# Patient Record
Sex: MALE | Employment: FULL TIME | URBAN - METROPOLITAN AREA
[De-identification: names, ages, dates, MRNs, and addresses within clinical notes are randomized per-mention and may not be internally consistent; named-entity substitution may affect disease eponyms.]

---

## 2020-09-01 LAB — MICROALBUMIN UR TEST STR-MCNC: 15 MG/DL

## 2021-02-02 LAB
CREATININE, EXTERNAL: 1.1
HBA1C MFR BLD HPLC: 14.9 %
LDL-C, EXTERNAL: 184

## 2021-02-04 RX ORDER — GLIPIZIDE 10 MG/1
TABLET ORAL
Qty: 60 TAB | Refills: 6 | Status: CANCELLED | OUTPATIENT
Start: 2021-02-04

## 2021-02-04 RX ORDER — GLIPIZIDE 10 MG/1
10 TABLET, FILM COATED, EXTENDED RELEASE ORAL 2 TIMES DAILY
Qty: 60 TAB | Refills: 2 | Status: SHIPPED | OUTPATIENT
Start: 2021-02-04

## 2021-02-04 RX ORDER — DAPAGLIFLOZIN AND METFORMIN HYDROCHLORIDE 5; 1000 MG/1; MG/1
TABLET, FILM COATED, EXTENDED RELEASE ORAL
Qty: 60 TAB | Refills: 6 | Status: SHIPPED | OUTPATIENT
Start: 2021-02-04 | End: 2022-10-12

## 2021-02-04 RX ORDER — PIOGLITAZONEHYDROCHLORIDE 30 MG/1
TABLET ORAL
Qty: 30 TAB | Refills: 6 | Status: SHIPPED | OUTPATIENT
Start: 2021-02-04 | End: 2022-10-12

## 2022-10-12 ENCOUNTER — OFFICE VISIT (OUTPATIENT)
Dept: SURGERY | Age: 57
End: 2022-10-12
Payer: COMMERCIAL

## 2022-10-12 VITALS
HEART RATE: 86 BPM | TEMPERATURE: 97.5 F | RESPIRATION RATE: 16 BRPM | WEIGHT: 195 LBS | DIASTOLIC BLOOD PRESSURE: 72 MMHG | OXYGEN SATURATION: 97 % | SYSTOLIC BLOOD PRESSURE: 107 MMHG | BODY MASS INDEX: 28.88 KG/M2 | HEIGHT: 69 IN

## 2022-10-12 DIAGNOSIS — L08.9 INFECTED CYST OF SKIN: Primary | ICD-10-CM

## 2022-10-12 DIAGNOSIS — L72.9 INFECTED CYST OF SKIN: Primary | ICD-10-CM

## 2022-10-12 PROCEDURE — 10060 I&D ABSCESS SIMPLE/SINGLE: CPT | Performed by: SURGERY

## 2022-10-12 PROCEDURE — 99203 OFFICE O/P NEW LOW 30 MIN: CPT | Performed by: SURGERY

## 2022-10-12 RX ORDER — SULFAMETHOXAZOLE AND TRIMETHOPRIM 800; 160 MG/1; MG/1
1 TABLET ORAL 2 TIMES DAILY
Qty: 20 TABLET | Refills: 0 | Status: SHIPPED | OUTPATIENT
Start: 2022-10-12 | End: 2022-10-22

## 2022-10-12 NOTE — PROGRESS NOTES
Surgery History and Physical    Subjective:      Maryam Holliday is a 65 y.o. male who presents for evaluation of back cyst. He has had it since last year. It didn't bother him. [facility] week, [facility] after it started to get irritation. He is not on any antibiotics. He tried to squeeze it. He has a history of diabetes - last A1c is 9. Past Medical History:   Diagnosis Date    Diabetes ([address]     High cholesterol     Hypertension      History reviewed. No pertinent surgical history. Family History   Problem Relation Age of Onset    Hypertension Mother     Diabetes Mother     Cancer Father      Social History     Tobacco Use    Smoking status: Never     Passive exposure: Never    Smokeless tobacco: Never   Substance Use Topics    Alcohol use: Never      Prior to Admission medications    Medication Sig Start Date End Date Taking? Authorizing Provider   glipiZIDE SR (GLUCOTROL XL) 10 mg CR tablet Take 1 Tab by mouth two (2) times a day. 2/4/21  Yes Licha Maxwell MD   metFORMIN (GLUCOPHAGE) 1,000 mg tablet Take 1,000 mg by mouth two (2) times daily (with meals). Yes Provider, Historical   atorvastatin (LIPITOR) 20 mg tablet Take 20 mg by mouth nightly. Yes Provider, Historical   losartan (COZAAR) 50 mg tablet Take 50 mg by mouth daily. Yes Provider, Historical   SITagliptin (JANUVIA) 100 mg tablet Take one tablet daily  Patient not taking: Reported on 10/12/2022 2/4/21   Licha Maxwell MD   dapagliflozin-metformin (Xigduo XR) 5-1,000 mg TBph Take one tablet twice daily with meals, STOP METFORMIN  Patient not taking: Reported on 10/12/2022 2/4/21   Licha Maxwell MD   pioglitazone (ACTOS) 30 mg tablet Take one tablet daily  Patient not taking: Reported on 10/12/2022 2/4/21   Licha Maxwell MD      No Known Allergies    Review of Systems:  A comprehensive review of systems was negative except for that written in the History of Present Illness.     Objective:      Visit Vitals  /72 (BP 1 Location: Left upper arm, BP Patient Position: Sitting, BP Cuff Size: Small adult)   Pulse 86   Temp 97.5 °F (36.4 °C) (Temporal)   Resp 16   Ht 5' 9\" (1.753 m)   Wt 88.5 kg (195 lb)   SpO2 97%   BMI 28.80 kg/m²         Physical Exam:  Physical Exam:  General:  Alert, cooperative, no distress, appears stated age. Eyes:  Conjunctivae/corneas clear. Ears:  Normal external ear canals both ears. Nose: Nares normal. Septum midline. Mouth/Throat: Lips, mucosa, and tongue normal. Teeth and gums normal.   Neck: Supple, symmetrical, trachea midline   Back:   Symmetric, no curvature. ROM normal.    Lungs:   Clear to auscultation bilaterally. Heart:  Regular rate and rhythm   Abdomen:   Soft, non-tender. Bowel sounds normal. No masses,  No organomegaly. Extremities: Extremities normal, atraumatic, no cyanosis or edema. Skin: Skin color, texture, turgor normal. No rashes or lesions. Infected sebaceous cyst on the back         Assessment:     65year old male infected sebaceous cyst    Plan:     Discussed the risk of surgery incision and drainage of sebaceous cyst including bleeding, infection, and recurrence,  and the risks of general anesthetic. The patient understands the risks; any and all questions were answered to the patient's satisfaction.   -will perform office I&D  -prescribe bactrim  -follow up in 1 month to discuss excision of cyst

## 2022-10-12 NOTE — LETTER
10/12/2022    Patient: Linsey Holguin   YOB: 1965   Date of Visit: 10/12/2022     Linsey Holguin, [address] [place]  [address] [address]  Via Fax: [phone]    Dear Linsey Holguin DO,      Thank you for referring Mr. Linsey Holguin to [facility] for evaluation. My notes for this consultation are attached. If you have questions, please do not hesitate to call me. I look forward to following your patient along with you.       Sincerely,    Drew Alvarez MD

## 2022-10-12 NOTE — PATIENT INSTRUCTIONS
Remove old bandage and packing at the end of your shower after you have rinse off all your soap and shampoo. Then, rinse wound out with clean shower water then blot dry. Apply ice for 5 minutes to deaden the pain. Then pack it with ribbon gauze and cover with new bandage. Do this at least once daily. Twice if possible. Overview  If you have a deep wound, your doctor may show you how to pack it. This helps keep the wound clean. It also helps it heal more evenly, from the inside out. You may be able to pack your wound yourself. Or you may need someone to help you reach it. It's important to wash your hands and keep the area clean when you pack the wound. Ask your doctor how often to change the packing and what supplies to use. How to get ready  How to get ready to pack your wound   Clean the table or sink where you will work. Wash your hands with soap and water. Cover your work area with a clean towel. Lay out the rest of your supplies. How to pack your wound     Fill the wound with packing material.   Don't pack it too tightly. Use a cotton swab to press the material into the wound. How to place the outer dressing   Place the outer dressing over the packing and the wound area. Tape it down securely. When should you call for help? Call your doctor now or seek immediate medical care if:    You have symptoms of a new infection or of an infection that's getting worse, such as: Increased pain, swelling, warmth, or redness. Red streaks leading from the area, or red streaks getting worse. Pus draining from the area or more pus than the bandage can absorb. A fever. You have lots of bleeding. Your wound is changing color or has a worse odor. Watch closely for changes in your health, and be sure to contact your doctor if:    You do not get better as expected. Follow-up care is a key part of your treatment and safety.  Be sure to make and go to all appointments, and call your doctor if you are having problems. It's also a good idea to know your test results and keep a list of the medicines you take.

## 2022-10-12 NOTE — PROGRESS NOTES
Identified pt with two pt identifiers (name and ). Reviewed chart in preparation for visit and have obtained necessary documentation. Kody Morgan is a 65 y.o. male  Chief Complaint   Patient presents with    Cyst     Seen at the request of Dr. Sheryle Kelly, eval cyst on back . Visit Vitals  /72 (BP 1 Location: Left upper arm, BP Patient Position: Sitting, BP Cuff Size: Small adult)   Pulse 86   Temp 97.5 °F (36.4 °C) (Temporal)   Resp 16   Ht 5' 9\" (1.753 m)   Wt 88.5 kg (195 lb)   SpO2 97%   BMI 28.80 kg/m²       1. Have you been to the ER, urgent care clinic since your last visit? Hospitalized since your last visit? No    2. Have you seen or consulted any other health care providers outside of the [address] [place] [address] since your last visit? Include any pap smears or colon screening. No    Pt states he saw his PCP at [facility] and after looking at his cyst it is now red and irritated.

## 2022-10-12 NOTE — PROCEDURES
Incision/Drainage Procedure Note    Preperative Diagnosis: back abscess  Post-operative Diagnosis: back abscess    Procedure: Incision and drainage of back abscess    Surgeon: Sheila Nam MD  Anesthesia: Local  Estimated Blood Loss: Minimal   Complications: None; patient tolerated the procedure well. Procedure Details: The risks, benefits, and alternatives were explained and consent was obtained for the procedure. The area was sterile prepped and draped in the usual manner. 1/2% marcaine with epinephrine was infiltrated into the skin overlying the abscess. An incision was made. A Large amount of pus was obtained. A culture was obtained. The loculations and crypts within the wound were broken up with hemostat. The wound was irrigated with isoto sonny saline. The wound was packed with iodoform gauze. A sterile dressing was then applied. The patient was then transported to the recovery room in stable condition.            Signed By: Sheila Nam MD

## 2022-10-14 ENCOUNTER — TELEPHONE (OUTPATIENT)
Dept: SURGERY | Age: 57
End: 2022-10-14

## 2022-10-14 NOTE — TELEPHONE ENCOUNTER
He wanted to know if he could travel with having to do dressing changes. I told him he could as long as he continued to do his dressing changes. He also wanted to know if he could buy more packing if needed and I told him that he can get it from the pharmacy.

## 2022-10-14 NOTE — TELEPHONE ENCOUNTER
Pt called asking about after care from surgery 10/12 for back abscess before next visit 10/23    Call back num [phone]

## 2022-10-15 LAB
BACTERIA SPEC CULT: NORMAL
BACTERIA SPEC CULT: NORMAL
GRAM STN SPEC: NORMAL
GRAM STN SPEC: NORMAL
SERVICE CMNT-IMP: NORMAL

## 2022-11-22 ENCOUNTER — OFFICE VISIT (OUTPATIENT)
Dept: SURGERY | Age: 57
End: 2022-11-22
Payer: COMMERCIAL

## 2022-11-22 VITALS
SYSTOLIC BLOOD PRESSURE: 134 MMHG | HEART RATE: 84 BPM | WEIGHT: 195 LBS | TEMPERATURE: 98.3 F | RESPIRATION RATE: 18 BRPM | HEIGHT: 69 IN | DIASTOLIC BLOOD PRESSURE: 81 MMHG | OXYGEN SATURATION: 98 % | BODY MASS INDEX: 28.88 KG/M2

## 2022-11-22 DIAGNOSIS — L72.0 EPIDERMAL CYST: Primary | ICD-10-CM

## 2022-11-22 PROCEDURE — 99213 OFFICE O/P EST LOW 20 MIN: CPT | Performed by: SURGERY

## 2022-11-22 NOTE — PROGRESS NOTES
Surgery History and Physical    Subjective:    11/22/22: Patient presents to discuss elective removal of his back cyst    10/12/22  Fritz Douglas is a 65 y.o. male who presents for evaluation of back cyst. He has had it since last year. It didn't bother him. Last week, [facility] after it started to get irritation. He is not on any antibiotics. He tried to squeeze it. He has a history of diabetes - last A1c is 9. Past Medical History:   Diagnosis Date    Diabetes ([address]     High cholesterol     Hypertension      Past Surgical History:   Procedure Laterality Date    HX CYST REMOVAL  10/2022    Dr. Jalyn Marcano      Family History   Problem Relation Age of Onset    Hypertension Mother     Diabetes Mother     Cancer Father      Social History     Tobacco Use    Smoking status: Never     Passive exposure: Never    Smokeless tobacco: Never   Substance Use Topics    Alcohol use: Never      Prior to Admission medications    Medication Sig Start Date End Date Taking? Authorizing Provider   glipiZIDE SR (GLUCOTROL XL) 10 mg CR tablet Take 1 Tab by mouth two (2) times a day. 2/4/21  Yes Carmen Lanza MD   metFORMIN (GLUCOPHAGE) 1,000 mg tablet Take 1,000 mg by mouth two (2) times daily (with meals). Yes Provider, Historical   atorvastatin (LIPITOR) 20 mg tablet Take 20 mg by mouth nightly. Yes Provider, Historical   losartan (COZAAR) 50 mg tablet Take 50 mg by mouth daily. Yes Provider, Historical      No Known Allergies    Review of Systems:  A comprehensive review of systems was negative except for that written in the History of Present Illness. Objective:      Visit Vitals  /81 (BP 1 Location: Right upper arm, BP Patient Position: Sitting, BP Cuff Size: Small adult)   Pulse 84   Temp 98.3 °F (36.8 °C) (Temporal)   Resp 18   Ht 5' 9\" (1.753 m)   Wt 88.5 kg (195 lb)   SpO2 98%   BMI 28.80 kg/m²         Physical Exam:  Physical Exam:  General:  Alert, cooperative, no distress, appears stated age. Eyes:  Conjunctivae/corneas clear. Ears:  Normal external ear canals both ears. Nose: Nares normal. Septum midline. Mouth/Throat: Lips, mucosa, and tongue normal. Teeth and gums normal.   Neck: Supple, symmetrical, trachea midline   Back:   Symmetric, no curvature. ROM normal.    Lungs:   Clear to auscultation bilaterally. Heart:  Regular rate and rhythm   Abdomen:   Soft, non-tender. Bowel sounds normal. No masses,  No organomegaly. Extremities: Extremities normal, atraumatic, no cyanosis or edema. Skin: Skin color, texture, turgor normal. No rashes or lesions. epidermal cyst on the back         Assessment:     65year old male infected sebaceous cyst    Plan:     Discussed the risk of surgery excision epidermal cyst including bleeding, infection, and recurrence. The patient understands the risks; any and all questions were answered to the patient's satisfaction.   -will plan for office procedure visit

## 2022-11-22 NOTE — PROGRESS NOTES
Identified pt with two pt identifiers (name and ). Reviewed chart in preparation for visit and have obtained necessary documentation. Lilibeth Kelley is a 65 y.o. male  Chief Complaint   Patient presents with    Surgical Follow-up     S/P 10/12/22 cyst removal      Visit Vitals  /81 (BP 1 Location: Right upper arm, BP Patient Position: Sitting, BP Cuff Size: Small adult)   Pulse 84   Temp 98.3 °F (36.8 °C) (Temporal)   Resp 18   Ht 5' 9\" (1.753 m)   Wt 88.5 kg (195 lb)   SpO2 98%   BMI 28.80 kg/m²       1. Have you been to the ER, urgent care clinic since your last visit? Hospitalized since your last visit? No    2. Have you seen or consulted any other health care providers outside of the [address] [place] [address] since your last visit? Include any pap smears or colon screening.  No

## 2022-11-22 NOTE — LETTER
11/22/2022    Patient: Esther Hook   YOB: 1965   Date of Visit: 11/22/2022     Esther Hook, [address] [place]  [address] [facility] [address]  Via Fax: [phone]    Dear Esther Hook DO,      Thank you for referring Mr. Esther Hook to [facility] for evaluation. My notes for this consultation are attached. If you have questions, please do not hesitate to call me. I look forward to following your patient along with you.       Sincerely,    Wilfrid Mason MD

## 2022-12-01 ENCOUNTER — HOSPITAL ENCOUNTER (OUTPATIENT)
Dept: LAB | Age: 57
Discharge: HOME | End: 2022-12-01

## 2022-12-01 ENCOUNTER — OFFICE VISIT (OUTPATIENT)
Dept: SURGERY | Age: 57
End: 2022-12-01
Payer: COMMERCIAL

## 2022-12-01 VITALS
TEMPERATURE: 97.7 F | DIASTOLIC BLOOD PRESSURE: 63 MMHG | HEIGHT: 69 IN | SYSTOLIC BLOOD PRESSURE: 131 MMHG | RESPIRATION RATE: 16 BRPM | BODY MASS INDEX: 27.88 KG/M2 | WEIGHT: 188.2 LBS | HEART RATE: 74 BPM | OXYGEN SATURATION: 98 %

## 2022-12-01 DIAGNOSIS — L72.0 EPIDERMAL CYST: Primary | ICD-10-CM

## 2022-12-01 NOTE — PROCEDURES
PROCEDURE NOTE      Mk Blanco is a 65 y.o. male who has been brought to the procedure room for excision of a 2 cm sebaceous cyst located on the back. The risks, benefits, and alternatives were explained and consent was obtained for the procedure. The area was sterile prepped and draped in the usual manner. 1% lidocaine with epinephrine was infiltrated into the skin and soft tissue surrounding the lesion. An incision was made. This was a thin walled cyst containing some white caseous material consistent with a sebaceous cyst.  It was sharply dissected free of surrounding tissues and excised in its entirety and sent to pathology. Hemostasis was noted. The wound was closed with interrupted 3-0 Vicryl, 4-0 Monocryl; followed by  Dermabond. Wound care instructions were given. He tolerated the procedure without difficulty. Length of incision: 2 cm x 2 cm to include minimal margins.

## 2022-12-01 NOTE — LETTER
NOTIFICATION RETURN TO WORK / SCHOOL    12/1/2022 11:26 AM    Mr. Sola Mireles  [address] [address]      To Whom It May Concern:    Sola Mireles is currently under the care of [facility]. He will return to work on: 12/5/2022 with no restrictions. If there are questions or concerns please have the patient contact our office.         Sincerely,      Dayne Melo MD

## 2022-12-01 NOTE — PROGRESS NOTES
Identified pt with two pt identifiers (name and ). Reviewed chart in preparation for visit and have obtained necessary documentation. Baltazar Becerril is a 65 y.o. male  Chief Complaint   Patient presents with    Procedure     Excision back cyst.     Visit Vitals  /63 (BP 1 Location: Left upper arm, BP Patient Position: Sitting, BP Cuff Size: Small adult)   Pulse 74   Temp 97.7 °F (36.5 °C) (Temporal)   Resp 16   Ht 5' 9\" (1.753 m)   Wt 85.4 kg (188 lb 3.2 oz)   SpO2 98%   BMI 27.79 kg/m²       1. Have you been to the ER, urgent care clinic since your last visit? Hospitalized since your last visit? No    2. Have you seen or consulted any other health care providers outside of the [address] [place] [address] since your last visit? Include any pap smears or colon screening.  No

## 2022-12-01 NOTE — PATIENT INSTRUCTIONS
Dr. Carlos Manuel Tavarez Discharge Instructions for:  Maxine Elliott    MRN: [id number] : 1965    Admitted: (Not on file)  Discharged: 2022       What to do at Home    Recommended diet: Resume your normal diet    Recommended activity: as tolerated    Follow-up with Dr. Carlos Manuel Tavarez in 1-2 weeks. Call [phone] for an appointment. Wound Care:  Replace outer gauze with new dry gauze daily starting today to protect the \"glue\". See additional instructions below: How to Care for Your Wound After Its Treated With DERMABOND* Topical Skin Adhesive    DERMABOND* Topical Skin Adhesive (2-octyl cyanoacrylate) is a sterile, liquid skin adhesivethat holds wound edges together. The film will usually remain in place for 5 to 10 days, thennaturally fall off your skin. The following will answer some of your questions and provide instructions for proper care for yourwound while it is healing:    CHECK WOUND APPEARANCE   Some swelling, redness, and pain are common with all wounds and normally will go away as the wound heals. If swelling, redness, or pain increases or if the wound feels warm to the touch, contact a doctor. Also contact a doctor if the wound edges reopen or separate. REPLACE BANDAGES   If your wound is bandaged, keep the bandage dry. Replace the dressing daily until the adhesive film has fallen off or if the bandage should become wet, unless otherwise instructed by your physician. When changing the dressing, do not place tape directly over the DERMABOND adhesive film, because removing the tape later may also remove the film. AVOID TOPICAL MEDICATIONS   Do not apply liquid or ointment medications or any other product to your wound while the DERMABOND adhesive film is in place. These may loosen the film before your wound is healed. KEEP WOUND DRY AND PROTECTED   You may occasionally and briefly wet your wound in the shower or bath.  Do not soak or scrub your wound, do not swim, and avoid periods of heavy perspiration until the DERMABOND adhesive has naturally fallen off. After showering or bathing, gently blot your wound dry with a soft towel. If a protective dressing is being used, apply a fresh, dry bandage, being sure to keep the tape off the DERMABOND adhesive film. Apply a clean, dry bandage over the wound if necessary to protect it. Protect your wound from injury until the skin has had sufficient time to heal.   Do not scratch, rub, or pick at the DERMABOND adhesive film. This may loosen the film before your wound is healed. Protect the wound from prolonged exposure to sunlight or tanning lamps while the film is in place.

## 2025-04-09 ENCOUNTER — OFFICE VISIT (OUTPATIENT)
Age: 60
End: 2025-04-09
Payer: COMMERCIAL

## 2025-04-09 VITALS
HEART RATE: 83 BPM | BODY MASS INDEX: 27.77 KG/M2 | WEIGHT: 194 LBS | SYSTOLIC BLOOD PRESSURE: 134 MMHG | DIASTOLIC BLOOD PRESSURE: 85 MMHG | OXYGEN SATURATION: 99 % | HEIGHT: 70 IN

## 2025-04-09 DIAGNOSIS — N52.9 IMPOTENCE: ICD-10-CM

## 2025-04-09 DIAGNOSIS — Z12.5 PROSTATE CANCER SCREENING: ICD-10-CM

## 2025-04-09 DIAGNOSIS — Z12.5 PROSTATE CANCER SCREENING: Primary | ICD-10-CM

## 2025-04-09 PROCEDURE — 99204 OFFICE O/P NEW MOD 45 MIN: CPT | Performed by: UROLOGY

## 2025-04-09 NOTE — ASSESSMENT & PLAN NOTE
He has side effects with PDE5i.  We discussed checking a testosterone level.    We discussed a venoseal or vacuum pump.  He is not interested in injections or an IPP.    He will explore a venoseal.

## 2025-04-09 NOTE — PROGRESS NOTES
Chief Complaint   Patient presents with    New Patient    Erectile Dysfunction     1. Have you been to the ER, urgent care clinic since your last visit?  Hospitalized since your last visit?No    2. Have you seen or consulted any other health care providers outside of the [facility] since your last visit?  Include any pap smears or colon screening. Yes When: 2024 Where: [facility] Reason for visit: Primary Care    /85   Pulse 83   Ht 1.778 m (5' 10\")   Wt 88 kg (194 lb)   SpO2 99%   BMI 27.84 kg/m²     
Epididymis:      Right: Not inflamed. No tenderness.      Left: Not inflamed. No tenderness.      Prostate: Not enlarged, not tender and no nodules present.   Musculoskeletal:         General: No deformity.      Cervical back: Normal range of motion.      Right lower leg: No edema.      Left lower leg: No edema.   Skin:     General: Skin is warm and dry.      Findings: No lesion.   Neurological:      General: No focal deficit present.      Mental Status: He is alert and oriented to person, place, and time.      Motor: No weakness.      Coordination: Coordination normal.      Gait: Gait normal.   Psychiatric:         Mood and Affect: Mood normal.             ASSESSMENT and PLAN  1. Prostate cancer screening  Comments:  Benign exam today. Check PSA.  He can get annual screening with his PCP  Orders:  -     PSA, Diagnostic; Future  2. Impotence  Assessment & Plan:  He has side effects with PDE5i.  We discussed checking a testosterone level.    We discussed a venoseal or vacuum pump.  He is not interested in injections or an IPP.    He will explore a venoseal.   Orders:  -     Testosterone, free, total; Future  -     PSA, Diagnostic; Future      No follow-ups on file.   Saul Ramírez MD       Please note that portions of this note was potentially completed with Dragon dictation, the computer voice recognition software.  Quite often unanticipated grammatical, syntax, homophones, and other interpretive errors are inadvertently transcribed by the computer software.  Please disregard these errors.  Please excuse any errors that have escaped final proofreading.  Thank you.

## 2025-04-10 ENCOUNTER — RESULTS FOLLOW-UP (OUTPATIENT)
Age: 60
End: 2025-04-10

## 2025-04-10 LAB — PSA SERPL-MCNC: 1.9 NG/ML (ref 0–4)

## 2025-04-12 LAB
TESTOST FREE SERPL-MCNC: 5.9 PG/ML (ref 7.2–24)
TESTOST SERPL-MCNC: 282 NG/DL (ref 264–916)